# Patient Record
Sex: FEMALE | Race: WHITE | NOT HISPANIC OR LATINO | Employment: FULL TIME | URBAN - NONMETROPOLITAN AREA
[De-identification: names, ages, dates, MRNs, and addresses within clinical notes are randomized per-mention and may not be internally consistent; named-entity substitution may affect disease eponyms.]

---

## 2022-11-01 PROCEDURE — U0003 INFECTIOUS AGENT DETECTION BY NUCLEIC ACID (DNA OR RNA); SEVERE ACUTE RESPIRATORY SYNDROME CORONAVIRUS 2 (SARS-COV-2) (CORONAVIRUS DISEASE [COVID-19]), AMPLIFIED PROBE TECHNIQUE, MAKING USE OF HIGH THROUGHPUT TECHNOLOGIES AS DESCRIBED BY CMS-2020-01-R: HCPCS | Performed by: NURSE PRACTITIONER

## 2024-08-11 ENCOUNTER — APPOINTMENT (OUTPATIENT)
Dept: CT IMAGING | Facility: HOSPITAL | Age: 39
End: 2024-08-11
Payer: MEDICAID

## 2024-08-11 ENCOUNTER — HOSPITAL ENCOUNTER (EMERGENCY)
Facility: HOSPITAL | Age: 39
Discharge: HOME | End: 2024-08-11
Admitting: EMERGENCY MEDICINE
Payer: MEDICAID

## 2024-08-11 ENCOUNTER — APPOINTMENT (OUTPATIENT)
Dept: ULTRASOUND IMAGING | Facility: HOSPITAL | Age: 39
End: 2024-08-11
Payer: MEDICAID

## 2024-08-11 VITALS
BODY MASS INDEX: 27.75 KG/M2 | RESPIRATION RATE: 20 BRPM | WEIGHT: 147 LBS | TEMPERATURE: 98.7 F | OXYGEN SATURATION: 100 % | DIASTOLIC BLOOD PRESSURE: 73 MMHG | HEART RATE: 83 BPM | HEIGHT: 61 IN | SYSTOLIC BLOOD PRESSURE: 111 MMHG

## 2024-08-11 DIAGNOSIS — I26.99 PULMONARY EMBOLISM ON LEFT: Primary | ICD-10-CM

## 2024-08-11 LAB
ALBUMIN SERPL-MCNC: 3.9 G/DL (ref 3.5–5.2)
ALBUMIN/GLOB SERPL: 1.3 G/DL
ALP SERPL-CCNC: 104 U/L (ref 39–117)
ALT SERPL W P-5'-P-CCNC: 16 U/L (ref 1–33)
ANION GAP SERPL CALCULATED.3IONS-SCNC: 10 MMOL/L (ref 5–15)
AST SERPL-CCNC: 21 U/L (ref 1–32)
B-HCG UR QL: NEGATIVE
BASOPHILS # BLD AUTO: 0.06 10*3/MM3 (ref 0–0.2)
BASOPHILS NFR BLD AUTO: 0.6 % (ref 0–1.5)
BILIRUB SERPL-MCNC: <0.2 MG/DL (ref 0–1.2)
BUN SERPL-MCNC: 17 MG/DL (ref 6–20)
BUN/CREAT SERPL: 23.9 (ref 7–25)
CALCIUM SPEC-SCNC: 9.3 MG/DL (ref 8.6–10.5)
CHLORIDE SERPL-SCNC: 102 MMOL/L (ref 98–107)
CO2 SERPL-SCNC: 24 MMOL/L (ref 22–29)
CREAT SERPL-MCNC: 0.71 MG/DL (ref 0.57–1)
DEPRECATED RDW RBC AUTO: 54.9 FL (ref 37–54)
EGFRCR SERPLBLD CKD-EPI 2021: 111.1 ML/MIN/1.73
EOSINOPHIL # BLD AUTO: 0.22 10*3/MM3 (ref 0–0.4)
EOSINOPHIL NFR BLD AUTO: 2.3 % (ref 0.3–6.2)
ERYTHROCYTE [DISTWIDTH] IN BLOOD BY AUTOMATED COUNT: 17.2 % (ref 12.3–15.4)
EXPIRATION DATE: NORMAL
GLOBULIN UR ELPH-MCNC: 2.9 GM/DL
GLUCOSE SERPL-MCNC: 100 MG/DL (ref 65–99)
HCT VFR BLD AUTO: 32.1 % (ref 34–46.6)
HGB BLD-MCNC: 10.1 G/DL (ref 12–15.9)
IMM GRANULOCYTES # BLD AUTO: 0.08 10*3/MM3 (ref 0–0.05)
IMM GRANULOCYTES NFR BLD AUTO: 0.8 % (ref 0–0.5)
INTERNAL NEGATIVE CONTROL: NORMAL
INTERNAL POSITIVE CONTROL: NORMAL
LYMPHOCYTES # BLD AUTO: 2.76 10*3/MM3 (ref 0.7–3.1)
LYMPHOCYTES NFR BLD AUTO: 29.2 % (ref 19.6–45.3)
Lab: NORMAL
MCH RBC QN AUTO: 27.4 PG (ref 26.6–33)
MCHC RBC AUTO-ENTMCNC: 31.5 G/DL (ref 31.5–35.7)
MCV RBC AUTO: 87 FL (ref 79–97)
MONOCYTES # BLD AUTO: 1.17 10*3/MM3 (ref 0.1–0.9)
MONOCYTES NFR BLD AUTO: 12.4 % (ref 5–12)
NEUTROPHILS NFR BLD AUTO: 5.16 10*3/MM3 (ref 1.7–7)
NEUTROPHILS NFR BLD AUTO: 54.7 % (ref 42.7–76)
NRBC BLD AUTO-RTO: 0 /100 WBC (ref 0–0.2)
PLATELET # BLD AUTO: 299 10*3/MM3 (ref 140–450)
PMV BLD AUTO: 9.5 FL (ref 6–12)
POTASSIUM SERPL-SCNC: 4.3 MMOL/L (ref 3.5–5.2)
PROT SERPL-MCNC: 6.8 G/DL (ref 6–8.5)
RBC # BLD AUTO: 3.69 10*6/MM3 (ref 3.77–5.28)
SODIUM SERPL-SCNC: 136 MMOL/L (ref 136–145)
TROPONIN T SERPL HS-MCNC: <6 NG/L
WBC NRBC COR # BLD AUTO: 9.45 10*3/MM3 (ref 3.4–10.8)

## 2024-08-11 PROCEDURE — 25510000001 IOPAMIDOL PER 1 ML: Performed by: PHYSICIAN ASSISTANT

## 2024-08-11 PROCEDURE — 93010 ELECTROCARDIOGRAM REPORT: CPT | Performed by: EMERGENCY MEDICINE

## 2024-08-11 PROCEDURE — 80053 COMPREHEN METABOLIC PANEL: CPT | Performed by: EMERGENCY MEDICINE

## 2024-08-11 PROCEDURE — 81025 URINE PREGNANCY TEST: CPT | Performed by: PHYSICIAN ASSISTANT

## 2024-08-11 PROCEDURE — 93971 EXTREMITY STUDY: CPT

## 2024-08-11 PROCEDURE — 85025 COMPLETE CBC W/AUTO DIFF WBC: CPT | Performed by: EMERGENCY MEDICINE

## 2024-08-11 PROCEDURE — 84484 ASSAY OF TROPONIN QUANT: CPT | Performed by: PHYSICIAN ASSISTANT

## 2024-08-11 PROCEDURE — 93971 EXTREMITY STUDY: CPT | Performed by: SURGERY

## 2024-08-11 PROCEDURE — 99285 EMERGENCY DEPT VISIT HI MDM: CPT

## 2024-08-11 PROCEDURE — 93005 ELECTROCARDIOGRAM TRACING: CPT | Performed by: PHYSICIAN ASSISTANT

## 2024-08-11 PROCEDURE — 71275 CT ANGIOGRAPHY CHEST: CPT

## 2024-08-11 RX ORDER — HYDROCODONE BITARTRATE AND ACETAMINOPHEN 5; 325 MG/1; MG/1
1 TABLET ORAL ONCE
Status: COMPLETED | OUTPATIENT
Start: 2024-08-11 | End: 2024-08-11

## 2024-08-11 RX ORDER — PROPRANOLOL HYDROCHLORIDE 10 MG/1
10 TABLET ORAL 2 TIMES DAILY
COMMUNITY

## 2024-08-11 RX ORDER — RIVAROXABAN 15 MG-20MG
KIT ORAL
Qty: 51 EACH | Refills: 0 | Status: SHIPPED | OUTPATIENT
Start: 2024-08-11

## 2024-08-11 RX ADMIN — IOPAMIDOL 100 ML: 755 INJECTION, SOLUTION INTRAVENOUS at 20:50

## 2024-08-11 RX ADMIN — RIVAROXABAN 15 MG: 15 TABLET, FILM COATED ORAL at 21:43

## 2024-08-11 RX ADMIN — HYDROCODONE BITARTRATE AND ACETAMINOPHEN 1 TABLET: 5; 325 TABLET ORAL at 20:31

## 2024-08-12 LAB
QT INTERVAL: 406 MS
QTC INTERVAL: 459 MS

## 2024-08-12 NOTE — ED PROVIDER NOTES
Subjective   History of Present Illness  Patient is a pleasant 39-year-old female with chief complaint of left lower extremity pain and swelling.    Patient describes for the past 3 to 4 days, she noted some discomfort in the back of her knee down into her calf.  Initially, it began after she stood up from sitting for 3 hours.  She works as a .  She had continued pain worse with extension and also worse when she is lying down with her legs extended.  She denies any associated fever.  She has noted some swelling in her left leg more than right.  She denies any chest pain, pressure, or tightness.  She denies shortness of breath.  She denies any fever.  She denies any history of DVT or PE in the past.  She does vape and she is on birth control.  Patient did seek medical attention at urgent care first and was advised in the ER to be further evaluated.  She denies any surgeries or travels in the past 3 months.  She denies any trauma.    Review of Systems   Constitutional:  Positive for activity change. Negative for fever.   HENT: Negative.     Respiratory: Negative.  Negative for chest tightness and shortness of breath.    Cardiovascular: Negative.  Positive for leg swelling. Negative for chest pain and palpitations.   Gastrointestinal: Negative.    Genitourinary: Negative.    Musculoskeletal: Negative.    Neurological: Negative.    Psychiatric/Behavioral: Negative.     All other systems reviewed and are negative.      Past Medical History:   Diagnosis Date    Bipolar 1 disorder     Fibromyalgia     Migraine     Personality disorder        Allergies   Allergen Reactions    Calamine Phenolated     Beta Adrenergic Blockers Anxiety    Lamotrigine Anxiety    Nicotine Rash     Nicotine patches causes reddness       Past Surgical History:   Procedure Laterality Date    CHOLECYSTECTOMY         History reviewed. No pertinent family history.    Social History     Socioeconomic History    Marital status:  "   Tobacco Use    Smoking status: Every Day     Current packs/day: 1.00     Types: Cigarettes    Smokeless tobacco: Never   Substance and Sexual Activity    Alcohol use: Yes    Drug use: No    Sexual activity: Yes     Partners: Male       Prior to Admission medications    Medication Sig Start Date End Date Taking? Authorizing Provider   acetaminophen (TYLENOL) 500 MG tablet Take 1 tablet by mouth Every 6 (Six) Hours As Needed.    Moe Murillo MD   linaclotide (Linzess) 290 MCG capsule capsule Take 1 capsule by mouth Every Morning Before Breakfast.    Moe Murillo MD   lithium carbonate 300 MG capsule Take 300 mg by mouth 3 (three) times a day with meals.    Moe Murillo MD   Omega-3 Fatty Acids (fish oil) 1000 MG capsule capsule Take  by mouth Daily With Breakfast.    Moe Murillo MD   pantoprazole (PROTONIX) 40 MG EC tablet Take 1 tablet by mouth Daily. 5/24/16   Moe Murillo MD   risperiDONE (risperDAL) 2 MG tablet Take 1 tablet by mouth 2 (Two) Times a Day.    Moe Murillo MD   rizatriptan (MAXALT) 10 MG tablet Take 10 mg by mouth once as needed for Migraine May repeat in 2 hours if needed    Moe Murillo MD   venlafaxine (EFFEXOR) 37.5 MG tablet Take 37.5 mg by mouth 2 (two) times a day.    Moe Murillo MD       Medications   rivaroxaban (XARELTO) tablet 15 mg (has no administration in time range)   HYDROcodone-acetaminophen (NORCO) 5-325 MG per tablet 1 tablet (1 tablet Oral Given 8/11/24 2031)   iopamidol (ISOVUE-370) 76 % injection 100 mL (100 mL Intravenous Given 8/11/24 2050)       /73 (BP Location: Right arm, Patient Position: Sitting)   Pulse 83   Temp 98.7 °F (37.1 °C) (Oral)   Resp 20   Ht 154.9 cm (61\")   Wt 66.7 kg (147 lb)   LMP 08/04/2024 (Exact Date)   SpO2 100%   BMI 27.78 kg/m²       Objective   Physical Exam  Vitals and nursing note reviewed.   Constitutional:       General: She is not in acute " distress.     Appearance: She is well-developed. She is not diaphoretic.   HENT:      Head: Normocephalic and atraumatic.   Eyes:      Conjunctiva/sclera: Conjunctivae normal.      Pupils: Pupils are equal, round, and reactive to light.   Neck:      Trachea: No tracheal deviation.   Cardiovascular:      Rate and Rhythm: Regular rhythm. Tachycardia present.      Heart sounds: Normal heart sounds. No murmur heard.  Pulmonary:      Effort: Pulmonary effort is normal.      Breath sounds: Normal breath sounds.   Abdominal:      General: Bowel sounds are normal. There is no distension.      Palpations: Abdomen is soft. There is no mass.      Tenderness: There is no abdominal tenderness. There is no guarding or rebound.   Musculoskeletal:         General: Swelling and tenderness present. Normal range of motion.      Cervical back: Normal range of motion and neck supple.      Right lower leg: No edema.      Left lower leg: Edema present.      Comments: Patient's left calf is notably larger than her right calf by a couple centimeters.  She does have sunburn on the right leg.  Tender to touch on the left gastrocnemius.   Skin:     General: Skin is warm and dry.      Capillary Refill: Capillary refill takes less than 2 seconds.   Neurological:      Mental Status: She is alert and oriented to person, place, and time.   Psychiatric:         Behavior: Behavior normal.         Thought Content: Thought content normal.         Judgment: Judgment normal.         Procedures         Lab Results (last 24 hours)       Procedure Component Value Units Date/Time    CBC & Differential [[id number]]  (Abnormal) Collected: 08/11/24 2027    Specimen: Blood Updated: 08/11/24 2035    Narrative:      The following orders were created for panel order CBC & Differential.  Procedure                               Abnormality         Status                     ---------                               -----------         ------                     CBC Auto  Differential[[id number]]         Abnormal            Final result                 Please view results for these tests on the individual orders.    Comprehensive Metabolic Panel [[id number]]  (Abnormal) Collected: 08/11/24 2027    Specimen: Blood Updated: 08/11/24 2053     Glucose 100 mg/dL      BUN 17 mg/dL      Creatinine 0.71 mg/dL      Sodium 136 mmol/L      Potassium 4.3 mmol/L      Chloride 102 mmol/L      CO2 24.0 mmol/L      Calcium 9.3 mg/dL      Total Protein 6.8 g/dL      Albumin 3.9 g/dL      ALT (SGPT) 16 U/L      AST (SGOT) 21 U/L      Alkaline Phosphatase 104 U/L      Total Bilirubin <0.2 mg/dL      Globulin 2.9 gm/dL      A/G Ratio 1.3 g/dL      BUN/Creatinine Ratio 23.9     Anion Gap 10.0 mmol/L      eGFR 111.1 mL/min/1.73     Narrative:      GFR Normal >60  Chronic Kidney Disease <60  Kidney Failure <15      CBC Auto Differential [[id number]]  (Abnormal) Collected: 08/11/24 2027    Specimen: Blood Updated: 08/11/24 2035     WBC 9.45 10*3/mm3      RBC 3.69 10*6/mm3      Hemoglobin 10.1 g/dL      Hematocrit 32.1 %      MCV 87.0 fL      MCH 27.4 pg      MCHC 31.5 g/dL      RDW 17.2 %      RDW-SD 54.9 fl      MPV 9.5 fL      Platelets 299 10*3/mm3      Neutrophil % 54.7 %      Lymphocyte % 29.2 %      Monocyte % 12.4 %      Eosinophil % 2.3 %      Basophil % 0.6 %      Immature Grans % 0.8 %      Neutrophils, Absolute 5.16 10*3/mm3      Lymphocytes, Absolute 2.76 10*3/mm3      Monocytes, Absolute 1.17 10*3/mm3      Eosinophils, Absolute 0.22 10*3/mm3      Basophils, Absolute 0.06 10*3/mm3      Immature Grans, Absolute 0.08 10*3/mm3      nRBC 0.0 /100 WBC     Single High Sensitivity Troponin T [[id number]]  (Normal) Collected: 08/11/24 2027    Specimen: Blood Updated: 08/11/24 2051     HS Troponin T <6 ng/L     Narrative:      High Sensitive Troponin T Reference Range:  <14.0 ng/L- Negative Female for AMI  <22.0 ng/L- Negative Male for AMI  >=14 - Abnormal Female indicating possible myocardial injury.  >=22 -  Abnormal Male indicating possible myocardial injury.   Clinicians would have to utilize clinical acumen, EKG, Troponin, and serial changes to determine if it is an Acute Myocardial Infarction or myocardial injury due to an underlying chronic condition.         POC Urine Pregnancy [[id number]]  (Normal) Collected: 08/11/24 2033    Specimen: Urine Updated: 08/11/24 2033     HCG, Urine, QL Negative     Lot Number \[id number]\     Internal Positive Control Passed     Internal Negative Control Passed     Expiration Date \01/28/2025\            Encounter Date    8/11/24    US Venous Doppler Lower Extremity Left (duplex) [[id number]] (Order [id number])  Order  Status: In process     Patient Location    Patient Class Location   Emergency [facility] EMERGENCY DEPT, DI13, 13     [phone]     Study Notes     Jeniffer Mosley on 8/11/2024  8:59 PM CDT   LT Lower Extremity  No thrombus visualized at this time     CT Angiogram Chest    Result Date: 8/11/2024  Narrative: CT ANGIOGRAM CHEST- 8/11/2024 7:46 PM  HISTORY: tachy leg swelling  COMPARISON: None  DOSE LENGTH PRODUCT: 206.37 mGy.cm. Automated exposure control was also utilized to decrease patient radiation dose.  TECHNIQUE: Helical tomographic images of the chest were obtained after the administration of intravenous contrast following angiogram protocol. Additionally, 3D and multiplanar reformatted images were provided.   FINDINGS:  Pulmonary arteries: There is adequate enhancement of the pulmonary arteries to evaluate for central and segmental pulmonary emboli. Acute pulmonary embolus in the left upper lobe lingula branch, image 69. The pulmonary arteries are within normal limits for size.  AORTA AND GREAT VESSELS: Thoracic aorta is normal in caliber. No dissection identified. No flow-limiting stenosis identified at the great vessel origins.  VISUALIZED NECK BASE: The imaged portion of the base of the neck appears unremarkable.  LUNGS: Lungs are well expanded. No consolidation. No  pleural effusion. Airways are clear.  HEART: The heart is normal in size. There is no pericardial effusion.  MEDIASTINUM AND LYMPH NODEs: No suspicious hilar or mediastinal adenopathy..  SKELETAL AND SOFT TISSUES: Chest wall soft tissues are unremarkable. No acute bony abnormality.  UPPER ABDOMEN: The imaged portion of the upper abdomen demonstrates no acute process.      Impression: 1.  Acute pulmonary embolus in the left upper lobe lingula branch. No evidence of right heart strain.   This report was signed and finalized on 8/11/2024 9:05 PM by Dr Jaswinder Payan.       ED Course  ED Course as of 08/11/24 2132   Sun Aug 11, 2024   2119 0 points  Nery Score  Stage I  Low risk  4.4 %  PE-related complications (death from PE, hemodynamic collapse, or recurrent nonfatal PE) at 30 days    3.1% PE-related mortality at 30 days [TK]   2121 EPSI [TK]   2122 Pulmonary embolism severity index:  39 points  Class I, Very Low Risk: 0-1.6% 30-day mortality in this group. [TK]   2130 Patient has been reassessed.  She has been educated the ultrasound did not show a DVT.  However, her CTA chest did show an acute pulmonary embolus in the left upper lobe lingula branch with no evidence of right heart strain.  Patient is no longer tachycardic.  She is not hypoxic.  Please see the Pesi score as well as Nery score.  She is low risk in both algorithms.    She will receive a dose of Xarelto while here in the ED.  Risk and benefits of DOAC treatment for her diagnosis of pulmonary embolism as well as the diagnose of pulmonary embolism has been discussed at length with the patient.  She understands that she will need to get off oral contraceptives as well as stop vaping.  Recommend close follow-up with her primary care provider.  Strict return precaution advised.  Patient voiced understanding will be discharged in stable condition. [TK]      ED Course User Index  [TK] Hattie Darling PA          [facility]      Final diagnoses:   Pulmonary  embolism on left     Disposition: Patient be discharged in stable condition.       Hattie Darling PA  08/11/24 2877

## 2024-08-29 ENCOUNTER — TELEPHONE (OUTPATIENT)
Dept: VASCULAR SURGERY | Facility: CLINIC | Age: 39
End: 2024-08-29
Payer: MEDICAID

## 2024-08-30 ENCOUNTER — PATIENT ROUNDING (BHMG ONLY) (OUTPATIENT)
Dept: VASCULAR SURGERY | Facility: CLINIC | Age: 39
End: 2024-08-30

## 2024-08-30 ENCOUNTER — OFFICE VISIT (OUTPATIENT)
Dept: VASCULAR SURGERY | Facility: CLINIC | Age: 39
End: 2024-08-30
Payer: MEDICAID

## 2024-08-30 VITALS
OXYGEN SATURATION: 98 % | BODY MASS INDEX: 27.38 KG/M2 | SYSTOLIC BLOOD PRESSURE: 118 MMHG | WEIGHT: 145 LBS | HEART RATE: 62 BPM | HEIGHT: 61 IN | DIASTOLIC BLOOD PRESSURE: 80 MMHG

## 2024-08-30 DIAGNOSIS — I26.93 SINGLE SUBSEGMENTAL PULMONARY EMBOLISM WITHOUT ACUTE COR PULMONALE: Primary | ICD-10-CM

## 2024-08-30 PROCEDURE — 1159F MED LIST DOCD IN RCRD: CPT | Performed by: NURSE PRACTITIONER

## 2024-08-30 PROCEDURE — 99214 OFFICE O/P EST MOD 30 MIN: CPT | Performed by: NURSE PRACTITIONER

## 2024-08-30 PROCEDURE — 1160F RVW MEDS BY RX/DR IN RCRD: CPT | Performed by: NURSE PRACTITIONER

## 2024-08-30 RX ORDER — PALIPERIDONE 3 MG
3 TABLET, EXTENDED RELEASE 24 HR ORAL EVERY MORNING
COMMUNITY

## 2024-08-30 RX ORDER — BUPROPION HYDROCHLORIDE 150 MG/1
150 TABLET ORAL EVERY MORNING
COMMUNITY
Start: 2024-08-23

## 2024-08-30 RX ORDER — ARIPIPRAZOLE 2 MG/1
2 TABLET ORAL DAILY
COMMUNITY

## 2024-08-30 RX ORDER — LEVOTHYROXINE SODIUM 125 UG/1
125 TABLET ORAL DAILY
COMMUNITY
Start: 2024-08-06

## 2024-08-30 RX ORDER — DICYCLOMINE HYDROCHLORIDE 10 MG/1
10 CAPSULE ORAL AS NEEDED
COMMUNITY
Start: 2024-08-06

## 2024-08-30 RX ORDER — HYDROXYZINE HYDROCHLORIDE 25 MG/1
1 TABLET, FILM COATED ORAL 3 TIMES DAILY
COMMUNITY
Start: 2024-07-31

## 2024-08-30 RX ORDER — FERROUS SULFATE 325(65) MG
325 TABLET ORAL 3 TIMES WEEKLY
COMMUNITY
Start: 2024-08-07

## 2024-08-30 NOTE — PROGRESS NOTES
08/30/2024      Viola Phoenix, TAINA  [address]  [facility]  [place] [address]    Rafia Miller  1985    Chief Complaint   Patient presents with    Pulmonary Embolism     Referred from Viola Phoenix for acute pulmonary embolism. Testing done 8/11/24. Patient states she's had been sitting at work, but after standing her left leg was hurting. Lynette to ED due to symptoms, and found that it had moved to lungs. Former smoker of 20 years, quit 2 months ago and started vaping.        Dear TAINA Brizuela:      HPI  I had the pleasure of seeing your patient Rafia Miller in the office today.  Thank you kindly for this consultation.  As you recall, Rafia Miller is a 39 y.o.  female who you are currently following for routine health maintenance.  Couple weeks ago she was seen in the emergency department with left lower extremity pain and swelling for 3 to 4-day.  She was sitting at work and when she stood up her left leg was hurting noted some swelling in the left more than the right.  She denies any previous history of DVT or in the past.  She did have noninvasive testing performed, which I did review in office.    Past Medical History:   Diagnosis Date    Bipolar 1 disorder     Fibromyalgia     Migraine     Personality disorder     Pulmonary embolism        Past Surgical History:   Procedure Laterality Date    CHOLECYSTECTOMY      COLONOSCOPY      ENDOSCOPY         Family History   Problem Relation Age of Onset    Cancer Mother        Social History     Socioeconomic History    Marital status:    Tobacco Use    Smoking status: Every Day     Current packs/day: 1.00     Types: Cigarettes    Smokeless tobacco: Never   Substance and Sexual Activity    Alcohol use: Yes    Drug use: No    Sexual activity: Yes     Partners: Male       Allergies   Allergen Reactions    Calamine Phenolated     Beta Adrenergic Blockers Anxiety    Lamotrigine Anxiety    Nicotine Rash     Nicotine patches causes  "reddness       Current Outpatient Medications   Medication Instructions    Abilify 2 mg, Oral, Daily    acetaminophen (TYLENOL) 500 mg, Oral, Every 6 Hours PRN    buPROPion XL (WELLBUTRIN XL) 150 mg, Oral, Every Morning    cyclobenzaprine (FLEXERIL) 5 mg, Oral, 3 Times Daily PRN    dicyclomine (BENTYL) 10 mg, Oral, As Needed    ferrous sulfate 325 mg, Oral, 3 Times Weekly    hydrOXYzine (ATARAX) 25 MG tablet 1 tablet, Oral, 3 times daily    Invega 3 mg, Oral, Every Morning    levothyroxine (SYNTHROID, LEVOTHROID) 125 mcg, Oral, Daily    linaclotide (LINZESS) 290 mcg, Oral, Every Morning Before Breakfast    lithium carbonate 300 mg, Oral, 3 Times Daily With Meals    Omega-3 Fatty Acids (fish oil) 1000 MG capsule capsule Oral, Daily With Breakfast    pantoprazole (PROTONIX) 40 mg, Oral, Daily    propranolol (INDERAL) 10 mg, Oral, 2 Times Daily    Rivaroxaban (Xarelto Starter Pack) tablet therapy pack starter pack Take as directed    rizatriptan (MAXALT) 10 MG tablet Take 10 mg by mouth once as needed for Migraine May repeat in 2 hours if needed    venlafaxine (EFFEXOR) 37.5 mg, Oral, 2 Times Daily        Review of Systems   Constitutional: Negative.    HENT: Negative.     Eyes: Negative.    Respiratory: Negative.     Cardiovascular:  Positive for leg swelling.   Gastrointestinal: Negative.    Endocrine: Negative.    Genitourinary: Negative.    Musculoskeletal: Negative.    Skin: Negative.    Allergic/Immunologic: Negative.    Neurological: Negative.    Hematological: Negative.    Psychiatric/Behavioral: Negative.         /80   Pulse 62   Ht 154.9 cm (61\")   Wt 65.8 kg (145 lb)   LMP 08/04/2024 (Exact Date)   SpO2 98%   BMI 27.40 kg/m²   Physical Exam  Vitals and nursing note reviewed.   Constitutional:       General: She is not in acute distress.     Appearance: Normal appearance. She is well-developed and overweight. She is not diaphoretic.   HENT:      Head: Normocephalic and atraumatic.   Eyes:      " General: No scleral icterus.     Pupils: Pupils are equal, round, and reactive to light.   Neck:      Thyroid: No thyromegaly.      Vascular: No carotid bruit or JVD.   Cardiovascular:      Rate and Rhythm: Normal rate and regular rhythm.      Pulses: Normal pulses.      Heart sounds: Normal heart sounds and S2 normal. No murmur heard.     No friction rub. No gallop.   Pulmonary:      Effort: Pulmonary effort is normal.      Breath sounds: Normal breath sounds.   Abdominal:      General: Bowel sounds are normal.      Palpations: Abdomen is soft.   Musculoskeletal:         General: Normal range of motion.      Cervical back: Normal range of motion and neck supple.   Skin:     General: Skin is warm and dry.   Neurological:      Mental Status: She is alert and oriented to person, place, and time.      Cranial Nerves: No cranial nerve deficit.   Psychiatric:         Behavior: Behavior normal.         Thought Content: Thought content normal.         Judgment: Judgment normal.         US Venous Doppler Lower Extremity Left (duplex)    Result Date: 9/3/2024  Narrative: History: Pain and swelling      Impression: Impression: There is no evidence of deep venous thrombosis or superficial thrombophlebitis of the left lower extremity.  Comments: Left lower extremity venous duplex exam was performed using color Doppler flow, Doppler wave form analysis, and grayscale imaging, with and without compression. There is no evidence of deep venous thrombosis of the common femoral, superficial femoral, popliteal, posterior tibial, and peroneal veins. There is no thrombus identified in the saphenofemoral junction or the greater saphenous vein.   This report was signed and finalized on 9/3/2024 1:17 PM by Dr. Kong Baker MD.      US Venous Doppler Lower Extremity Left (duplex)    Result Date: 8/12/2024  Narrative: History: Pain and swelling      Impression: Impression: There is no evidence of deep venous thrombosis or superficial  thrombophlebitis of the left lower extremity.  Comments: Left lower extremity venous duplex exam was performed using color Doppler flow, Doppler wave form analysis, and grayscale imaging, with and without compression. There is no evidence of deep venous thrombosis of the common femoral, superficial femoral, popliteal, posterior tibial, and peroneal veins. There is no thrombus identified in the saphenofemoral junction or the greater saphenous vein.   This report was signed and finalized on 8/12/2024 9:50 AM by Dr. Kong Baker MD.      CT Angiogram Chest    Result Date: 8/11/2024  Narrative: CT ANGIOGRAM CHEST- 8/11/2024 7:46 PM  HISTORY: tachy leg swelling  COMPARISON: None  DOSE LENGTH PRODUCT: 206.37 mGy.cm. Automated exposure control was also utilized to decrease patient radiation dose.  TECHNIQUE: Helical tomographic images of the chest were obtained after the administration of intravenous contrast following angiogram protocol. Additionally, 3D and multiplanar reformatted images were provided.   FINDINGS:  Pulmonary arteries: There is adequate enhancement of the pulmonary arteries to evaluate for central and segmental pulmonary emboli. Acute pulmonary embolus in the left upper lobe lingula branch, image 69. The pulmonary arteries are within normal limits for size.  AORTA AND GREAT VESSELS: Thoracic aorta is normal in caliber. No dissection identified. No flow-limiting stenosis identified at the great vessel origins.  VISUALIZED NECK BASE: The imaged portion of the base of the neck appears unremarkable.  LUNGS: Lungs are well expanded. No consolidation. No pleural effusion. Airways are clear.  HEART: The heart is normal in size. There is no pericardial effusion.  MEDIASTINUM AND LYMPH NODEs: No suspicious hilar or mediastinal adenopathy..  SKELETAL AND SOFT TISSUES: Chest wall soft tissues are unremarkable. No acute bony abnormality.  UPPER ABDOMEN: The imaged portion of the upper abdomen demonstrates no  acute process.      Impression: 1.  Acute pulmonary embolus in the left upper lobe lingula branch. No evidence of right heart strain.   This report was signed and finalized on 8/11/2024 9:05 PM by Dr Jaswinder Payan.       There is no problem list on file for this patient.        ICD-10-CM ICD-9-CM   1. Single subsegmental pulmonary embolism without acute cor pulmonale  I26.93 415.19           Plan: After thoroughly evaluating Rafia Miller, I believe the best course of action is to remain conservative from vascular surgery standpoint.  I did review her testing which showed no evidence of DVT however she had did have an acute PE.  She denied any chest pain or shortness of breath.  I would recommend her to discontinue vaping.  She will need to be on anticoagulation for a year given pulmonary embolus.  This will be managed by her primary care provider.  There is no intervention needed from a vascular surgery standpoint.  I did recommend she could wear compression stockings if she develops swelling to her lower extremities.  She can follow-up in our office as needed.  The patient can continue taking their current medication regimen as previously planned.  This was all discussed in full with complete understanding.    Thank you for allowing me to participate in the care of your patient.  Please do not hesitate with any questions or concerns.  I will keep you aware of any further encounters with Rafia Miller.        Sincerely yours,         TAINA Mayberry

## 2024-08-30 NOTE — LETTER
September 4, 2024       No Recipients    Patient: Rafia Miller   YOB: 1985   Date of Visit: 8/30/2024     Dear TAINA Billings:       Thank you for referring Rafia Miller to me for evaluation. Below are the relevant portions of my assessment and plan of care.    If you have questions, please do not hesitate to call me. I look forward to following Rafia along with you.         Sincerely,        TAINA Mayberry        CC:   No Recipients    Chetna Claudio APRN  09/04/24 1722  Sign when Signing Visit  08/30/2024      Viola Phoenix APRN  [address] [place] [address]    Rafia Miller  1985    Chief Complaint   Patient presents with   • Pulmonary Embolism     Referred from Viola Phoenix for acute pulmonary embolism. Testing done 8/11/24. Patient states she's had been sitting at work, but after standing her left leg was hurting. Lynette to ED due to symptoms, and found that it had moved to lungs. Former smoker of 20 years, quit 2 months ago and started vaping.        Dear Viola Phoenix, APRN:      HPI  I had the pleasure of seeing your patient Rafia Miller in the office today.  Thank you kindly for this consultation.  As you recall, Rafia Miller is a 39 y.o.  female who you are currently following for routine health maintenance.  Couple weeks ago she was seen in the emergency department with left lower extremity pain and swelling for 3 to 4-day.  She was sitting at work and when she stood up her left leg was hurting noted some swelling in the left more than the right.  She denies any previous history of DVT or in the past.  She did have noninvasive testing performed, which I did review in office.    Past Medical History:   Diagnosis Date   • Bipolar 1 disorder    • Fibromyalgia    • Migraine    • Personality disorder    • Pulmonary embolism        Past Surgical History:   Procedure Laterality Date   • CHOLECYSTECTOMY     • COLONOSCOPY     •  ENDOSCOPY         Family History   Problem Relation Age of Onset   • Cancer Mother        Social History     Socioeconomic History   • Marital status:    Tobacco Use   • Smoking status: Every Day     Current packs/day: 1.00     Types: Cigarettes   • Smokeless tobacco: Never   Substance and Sexual Activity   • Alcohol use: Yes   • Drug use: No   • Sexual activity: Yes     Partners: Male       Allergies   Allergen Reactions   • Calamine Phenolated    • Beta Adrenergic Blockers Anxiety   • Lamotrigine Anxiety   • Nicotine Rash     Nicotine patches causes reddness       Current Outpatient Medications   Medication Instructions   • Abilify 2 mg, Oral, Daily   • acetaminophen (TYLENOL) 500 mg, Oral, Every 6 Hours PRN   • buPROPion XL (WELLBUTRIN XL) 150 mg, Oral, Every Morning   • cyclobenzaprine (FLEXERIL) 5 mg, Oral, 3 Times Daily PRN   • dicyclomine (BENTYL) 10 mg, Oral, As Needed   • ferrous sulfate 325 mg, Oral, 3 Times Weekly   • hydrOXYzine (ATARAX) 25 MG tablet 1 tablet, Oral, 3 times daily   • Invega 3 mg, Oral, Every Morning   • levothyroxine (SYNTHROID, LEVOTHROID) 125 mcg, Oral, Daily   • linaclotide (LINZESS) 290 mcg, Oral, Every Morning Before Breakfast   • lithium carbonate 300 mg, Oral, 3 Times Daily With Meals   • Omega-3 Fatty Acids (fish oil) 1000 MG capsule capsule Oral, Daily With Breakfast   • pantoprazole (PROTONIX) 40 mg, Oral, Daily   • propranolol (INDERAL) 10 mg, Oral, 2 Times Daily   • Rivaroxaban (Xarelto Starter Pack) tablet therapy pack starter pack Take as directed   • rizatriptan (MAXALT) 10 MG tablet Take 10 mg by mouth once as needed for Migraine May repeat in 2 hours if needed   • venlafaxine (EFFEXOR) 37.5 mg, Oral, 2 Times Daily        Review of Systems   Constitutional: Negative.    HENT: Negative.     Eyes: Negative.    Respiratory: Negative.     Cardiovascular:  Positive for leg swelling.   Gastrointestinal: Negative.    Endocrine: Negative.    Genitourinary: Negative.   "  Musculoskeletal: Negative.    Skin: Negative.    Allergic/Immunologic: Negative.    Neurological: Negative.    Hematological: Negative.    Psychiatric/Behavioral: Negative.         /80   Pulse 62   Ht 154.9 cm (61\")   Wt 65.8 kg (145 lb)   LMP 08/04/2024 (Exact Date)   SpO2 98%   BMI 27.40 kg/m²   Physical Exam  Vitals and nursing note reviewed.   Constitutional:       General: She is not in acute distress.     Appearance: Normal appearance. She is well-developed and overweight. She is not diaphoretic.   HENT:      Head: Normocephalic and atraumatic.   Eyes:      General: No scleral icterus.     Pupils: Pupils are equal, round, and reactive to light.   Neck:      Thyroid: No thyromegaly.      Vascular: No carotid bruit or JVD.   Cardiovascular:      Rate and Rhythm: Normal rate and regular rhythm.      Pulses: Normal pulses.      Heart sounds: Normal heart sounds and S2 normal. No murmur heard.     No friction rub. No gallop.   Pulmonary:      Effort: Pulmonary effort is normal.      Breath sounds: Normal breath sounds.   Abdominal:      General: Bowel sounds are normal.      Palpations: Abdomen is soft.   Musculoskeletal:         General: Normal range of motion.      Cervical back: Normal range of motion and neck supple.   Skin:     General: Skin is warm and dry.   Neurological:      Mental Status: She is alert and oriented to person, place, and time.      Cranial Nerves: No cranial nerve deficit.   Psychiatric:         Behavior: Behavior normal.         Thought Content: Thought content normal.         Judgment: Judgment normal.         US Venous Doppler Lower Extremity Left (duplex)    Result Date: 9/3/2024  Narrative: History: Pain and swelling      Impression: Impression: There is no evidence of deep venous thrombosis or superficial thrombophlebitis of the left lower extremity.  Comments: Left lower extremity venous duplex exam was performed using color Doppler flow, Doppler wave form analysis, and " grayscale imaging, with and without compression. There is no evidence of deep venous thrombosis of the common femoral, superficial femoral, popliteal, posterior tibial, and peroneal veins. There is no thrombus identified in the saphenofemoral junction or the greater saphenous vein.   This report was signed and finalized on 9/3/2024 1:17 PM by Dr. Kong Baker MD.      US Venous Doppler Lower Extremity Left (duplex)    Result Date: 8/12/2024  Narrative: History: Pain and swelling      Impression: Impression: There is no evidence of deep venous thrombosis or superficial thrombophlebitis of the left lower extremity.  Comments: Left lower extremity venous duplex exam was performed using color Doppler flow, Doppler wave form analysis, and grayscale imaging, with and without compression. There is no evidence of deep venous thrombosis of the common femoral, superficial femoral, popliteal, posterior tibial, and peroneal veins. There is no thrombus identified in the saphenofemoral junction or the greater saphenous vein.   This report was signed and finalized on 8/12/2024 9:50 AM by Dr. Kong Baker MD.      CT Angiogram Chest    Result Date: 8/11/2024  Narrative: CT ANGIOGRAM CHEST- 8/11/2024 7:46 PM  HISTORY: tachy leg swelling  COMPARISON: None  DOSE LENGTH PRODUCT: 206.37 mGy.cm. Automated exposure control was also utilized to decrease patient radiation dose.  TECHNIQUE: Helical tomographic images of the chest were obtained after the administration of intravenous contrast following angiogram protocol. Additionally, 3D and multiplanar reformatted images were provided.   FINDINGS:  Pulmonary arteries: There is adequate enhancement of the pulmonary arteries to evaluate for central and segmental pulmonary emboli. Acute pulmonary embolus in the left upper lobe lingula branch, image 69. The pulmonary arteries are within normal limits for size.  AORTA AND GREAT VESSELS: Thoracic aorta is normal in caliber. No dissection  identified. No flow-limiting stenosis identified at the great vessel origins.  VISUALIZED NECK BASE: The imaged portion of the base of the neck appears unremarkable.  LUNGS: Lungs are well expanded. No consolidation. No pleural effusion. Airways are clear.  HEART: The heart is normal in size. There is no pericardial effusion.  MEDIASTINUM AND LYMPH NODEs: No suspicious hilar or mediastinal adenopathy..  SKELETAL AND SOFT TISSUES: Chest wall soft tissues are unremarkable. No acute bony abnormality.  UPPER ABDOMEN: The imaged portion of the upper abdomen demonstrates no acute process.      Impression: 1.  Acute pulmonary embolus in the left upper lobe lingula branch. No evidence of right heart strain.   This report was signed and finalized on 8/11/2024 9:05 PM by Dr Jaswinder Payan.       There is no problem list on file for this patient.        ICD-10-CM ICD-9-CM   1. Single subsegmental pulmonary embolism without acute cor pulmonale  I26.93 415.19           Plan: After thoroughly evaluating Rafia Miller, I believe the best course of action is to remain conservative from vascular surgery standpoint.  I did review her testing which showed no evidence of DVT however she had did have an acute PE.  She denied any chest pain or shortness of breath.  I would recommend her to discontinue vaping.  She will need to be on anticoagulation for a year given pulmonary embolus.  This will be managed by her primary care provider.  There is no intervention needed from a vascular surgery standpoint.  I did recommend she could wear compression stockings if she develops swelling to her lower extremities.  She can follow-up in our office as needed.  The patient can continue taking their current medication regimen as previously planned.  This was all discussed in full with complete understanding.    Thank you for allowing me to participate in the care of your patient.  Please do not hesitate with any questions or concerns.  I will  keep you aware of any further encounters with Rafia Miller.        Sincerely yours,         TAINA Mayberry

## 2024-08-30 NOTE — PROGRESS NOTES
August 30, 2024    Hello, may I speak with Rafia Miller?    My name is Rosetta Morelos CMA    I am  with [facility]  [address], [address]  [place] [place] [address] [phone].    Before we get started may I verify your date of birth? 1985    I am calling to officially welcome you to our practice and ask about your recent visit. Is this a good time to talk? yes    Tell me about your visit with us. What things went well?  appointment well        We're always looking for ways to make our patients' experiences even better. Do you have recommendations on ways we may improve?  no    Overall were you satisfied with your first visit to our practice? yes       I appreciate you taking the time to speak with me today. Is there anything else I can do for you? no      Thank you, and have a great day.

## 2024-09-02 ENCOUNTER — HOSPITAL ENCOUNTER (EMERGENCY)
Facility: HOSPITAL | Age: 39
Discharge: HOME | End: 2024-09-02
Attending: INTERNAL MEDICINE
Payer: MEDICAID

## 2024-09-02 ENCOUNTER — APPOINTMENT (OUTPATIENT)
Dept: ULTRASOUND IMAGING | Facility: HOSPITAL | Age: 39
End: 2024-09-02
Payer: MEDICAID

## 2024-09-02 VITALS
OXYGEN SATURATION: 99 % | SYSTOLIC BLOOD PRESSURE: 113 MMHG | TEMPERATURE: 98.2 F | HEIGHT: 61 IN | DIASTOLIC BLOOD PRESSURE: 75 MMHG | WEIGHT: 147 LBS | RESPIRATION RATE: 16 BRPM | BODY MASS INDEX: 27.75 KG/M2 | HEART RATE: 79 BPM

## 2024-09-02 DIAGNOSIS — M79.605 LEFT LEG PAIN: Primary | ICD-10-CM

## 2024-09-02 PROCEDURE — 93971 EXTREMITY STUDY: CPT | Performed by: SURGERY

## 2024-09-02 PROCEDURE — 99284 EMERGENCY DEPT VISIT MOD MDM: CPT

## 2024-09-02 PROCEDURE — 93971 EXTREMITY STUDY: CPT

## 2024-09-02 RX ORDER — CYCLOBENZAPRINE HCL 5 MG
5 TABLET ORAL 3 TIMES DAILY PRN
Qty: 15 TABLET | Refills: 0 | Status: SHIPPED | OUTPATIENT
Start: 2024-09-02

## 2024-09-02 NOTE — DISCHARGE INSTRUCTIONS
Muscle relaxer was called into your pharmacy this afternoon as we discussed.  I am hopeful that this will help with your pain.  Return to the emergency department if your symptoms worsen.    Please call your PCP tomorrow for possible follow-up.  Please continue with your Xarelto as previously directed.

## 2024-09-02 NOTE — ED PROVIDER NOTES
Subjective   History of Present Illness  39-year-old female who presents emergency department with left leg pain.  She states it started about an hour and a half ago prior to presentation.  She points to the left posterior thigh area.  She states that she was recently diagnosed with a PE.  She is concerned that she has a clot in her leg as well.  She is currently taking the Xarelto starter pack.  She did miss 1 dose about a week ago.  She has no chest pain.  She has no shortness of breath.    Review of Systems   Respiratory:  Negative for shortness of breath.    Cardiovascular:  Negative for chest pain.   Musculoskeletal:  Positive for myalgias.       Past Medical History:   Diagnosis Date    Bipolar 1 disorder     Fibromyalgia     Migraine     Personality disorder     Pulmonary embolism        Allergies   Allergen Reactions    Calamine Phenolated     Beta Adrenergic Blockers Anxiety    Lamotrigine Anxiety    Nicotine Rash     Nicotine patches causes reddness       Past Surgical History:   Procedure Laterality Date    CHOLECYSTECTOMY      COLONOSCOPY      ENDOSCOPY         Family History   Problem Relation Age of Onset    Cancer Mother        Social History     Socioeconomic History    Marital status:    Tobacco Use    Smoking status: Every Day     Current packs/day: 1.00     Types: Cigarettes    Smokeless tobacco: Never   Substance and Sexual Activity    Alcohol use: Yes    Drug use: No    Sexual activity: Yes     Partners: Male           Objective   Physical Exam  Vitals reviewed.   Constitutional:       Appearance: Normal appearance. She is not ill-appearing.   HENT:      Head: Normocephalic and atraumatic.      Right Ear: External ear normal.      Left Ear: External ear normal.      Nose: Nose normal.   Eyes:      General: No scleral icterus.     Conjunctiva/sclera: Conjunctivae normal.   Cardiovascular:      Rate and Rhythm: Normal rate and regular rhythm.   Pulmonary:      Effort: Pulmonary effort is  normal.   Abdominal:      Palpations: Abdomen is soft.   Musculoskeletal:         General: No tenderness.      Cervical back: Normal range of motion and neck supple.   Skin:     General: Skin is warm and dry.      Comments: Palpation of the posterior thigh did not reveal any lumps or nodules under the skin.  There is no noted abrasion or injury to the skin.  No erythema of the skin.  No edema in the area of the pain.   Neurological:      Mental Status: She is alert and oriented to person, place, and time.      Cranial Nerves: No cranial nerve deficit.   Psychiatric:         Mood and Affect: Mood normal.         Behavior: Behavior normal.         Procedures       Venous doppler ordered. Patient offered pain medication and declined at this time.     ED Course  ED Course as of 09/02/24 1239   Mon Sep 02, 2024   1237 I spoke with the patient.  Discussed that she did not have a DVT in the left leg.  Discussed if she would like for me to call in a muscle relaxer to see if that would help with the pain.  She voiced understanding was agreeable with this plan discharge.  She will be discharged home in stable condition. [AJ]      ED Course User Index  [AJ] Placido Durand DO Flickinger, Brian J on 9/2/2024 12:02 PM CDT   LLE no thrombus vis                              Muscle relaxer was called into your pharmacy this afternoon as we discussed. I am hopeful that this will help with your pain.  Return to the emergency department if your symptoms worsen.   Please call your PCP tomorrow for possible follow-up.  Please continue with your Xarelto as previously directed.             Medical Decision Making  DDX: Cellulitis, DVT, Muscle strain    Amount and/or Complexity of Data Reviewed  Radiology: ordered.     Details: Venous US Left extremity         Final diagnoses:   Left leg pain       ED Disposition  ED Disposition       ED Disposition   Discharge    Condition   Stable    Comment   --               Suzanne Cook,  APRN  [address] [place]  [place] [id number] [phone]    In 2 days           Medication List        New Prescriptions      cyclobenzaprine 5 MG tablet  Commonly known as: FLEXERIL  Take 1 tablet by mouth 3 (Three) Times a Day As Needed for Muscle Spasms.               Where to Get Your Medications        These medications were sent to [facility]/pharmacy #[id number] - [place], [place] - [address] AT ACROSS FROM [address]  [phone] [facility] [phone] [facility] [address] [facility] [address]      Phone: [phone]   cyclobenzaprine 5 MG tablet            Placido Durand, DO  09/02/24 1048       Placido Durand, DO  09/02/24 1212       Placido Durand, DO  09/02/24 1234

## 2025-02-06 ENCOUNTER — HOSPITAL ENCOUNTER (EMERGENCY)
Facility: HOSPITAL | Age: 40
Discharge: HOME | End: 2025-02-06
Attending: EMERGENCY MEDICINE
Payer: MEDICAID

## 2025-02-06 ENCOUNTER — APPOINTMENT (OUTPATIENT)
Dept: GENERAL RADIOLOGY | Facility: HOSPITAL | Age: 40
End: 2025-02-06
Payer: MEDICAID

## 2025-02-06 VITALS
DIASTOLIC BLOOD PRESSURE: 89 MMHG | RESPIRATION RATE: 20 BRPM | HEART RATE: 87 BPM | TEMPERATURE: 97.8 F | SYSTOLIC BLOOD PRESSURE: 115 MMHG | OXYGEN SATURATION: 100 % | BODY MASS INDEX: 27.75 KG/M2 | WEIGHT: 147 LBS | HEIGHT: 61 IN

## 2025-02-06 DIAGNOSIS — R79.89 ELEVATED LFTS: ICD-10-CM

## 2025-02-06 DIAGNOSIS — R11.2 NAUSEA AND VOMITING, UNSPECIFIED VOMITING TYPE: Primary | ICD-10-CM

## 2025-02-06 LAB
ALBUMIN SERPL-MCNC: 4.6 G/DL (ref 3.5–5.2)
ALBUMIN/GLOB SERPL: 1.9 G/DL
ALP SERPL-CCNC: 85 U/L (ref 39–117)
ALT SERPL W P-5'-P-CCNC: 46 U/L (ref 1–33)
ANION GAP SERPL CALCULATED.3IONS-SCNC: 14 MMOL/L (ref 5–15)
AST SERPL-CCNC: 53 U/L (ref 1–32)
BASOPHILS # BLD AUTO: 0.04 10*3/MM3 (ref 0–0.2)
BASOPHILS NFR BLD AUTO: 0.9 % (ref 0–1.5)
BILIRUB SERPL-MCNC: 0.3 MG/DL (ref 0–1.2)
BUN SERPL-MCNC: 10 MG/DL (ref 6–20)
BUN/CREAT SERPL: 11.8 (ref 7–25)
CALCIUM SPEC-SCNC: 10.4 MG/DL (ref 8.6–10.5)
CHLORIDE SERPL-SCNC: 104 MMOL/L (ref 98–107)
CO2 SERPL-SCNC: 22 MMOL/L (ref 22–29)
CREAT SERPL-MCNC: 0.85 MG/DL (ref 0.57–1)
DEPRECATED RDW RBC AUTO: 47.8 FL (ref 37–54)
EGFRCR SERPLBLD CKD-EPI 2021: 89.5 ML/MIN/1.73
EOSINOPHIL # BLD AUTO: 0.11 10*3/MM3 (ref 0–0.4)
EOSINOPHIL NFR BLD AUTO: 2.4 % (ref 0.3–6.2)
ERYTHROCYTE [DISTWIDTH] IN BLOOD BY AUTOMATED COUNT: 14.1 % (ref 12.3–15.4)
FLUAV RNA RESP QL NAA+PROBE: NOT DETECTED
FLUBV RNA RESP QL NAA+PROBE: NOT DETECTED
GLOBULIN UR ELPH-MCNC: 2.4 GM/DL
GLUCOSE SERPL-MCNC: 81 MG/DL (ref 65–99)
HCG SERPL QL: NEGATIVE
HCT VFR BLD AUTO: 35 % (ref 34–46.6)
HGB BLD-MCNC: 11.3 G/DL (ref 12–15.9)
IMM GRANULOCYTES # BLD AUTO: 0.03 10*3/MM3 (ref 0–0.05)
IMM GRANULOCYTES NFR BLD AUTO: 0.7 % (ref 0–0.5)
LIPASE SERPL-CCNC: 42 U/L (ref 13–60)
LITHIUM SERPL-SCNC: 0.8 MMOL/L (ref 0.6–1.2)
LYMPHOCYTES # BLD AUTO: 1.45 10*3/MM3 (ref 0.7–3.1)
LYMPHOCYTES NFR BLD AUTO: 31.9 % (ref 19.6–45.3)
MAGNESIUM SERPL-MCNC: 1.8 MG/DL (ref 1.6–2.6)
MCH RBC QN AUTO: 29.9 PG (ref 26.6–33)
MCHC RBC AUTO-ENTMCNC: 32.3 G/DL (ref 31.5–35.7)
MCV RBC AUTO: 92.6 FL (ref 79–97)
MONOCYTES # BLD AUTO: 0.7 10*3/MM3 (ref 0.1–0.9)
MONOCYTES NFR BLD AUTO: 15.4 % (ref 5–12)
NEUTROPHILS NFR BLD AUTO: 2.22 10*3/MM3 (ref 1.7–7)
NEUTROPHILS NFR BLD AUTO: 48.7 % (ref 42.7–76)
NRBC BLD AUTO-RTO: 0 /100 WBC (ref 0–0.2)
PLATELET # BLD AUTO: 253 10*3/MM3 (ref 140–450)
PMV BLD AUTO: 9.4 FL (ref 6–12)
POTASSIUM SERPL-SCNC: 4 MMOL/L (ref 3.5–5.2)
PROCALCITONIN SERPL-MCNC: 0.04 NG/ML (ref 0–0.25)
PROT SERPL-MCNC: 7 G/DL (ref 6–8.5)
RBC # BLD AUTO: 3.78 10*6/MM3 (ref 3.77–5.28)
SARS-COV-2 RNA RESP QL NAA+PROBE: NOT DETECTED
SODIUM SERPL-SCNC: 140 MMOL/L (ref 136–145)
WBC NRBC COR # BLD AUTO: 4.55 10*3/MM3 (ref 3.4–10.8)

## 2025-02-06 PROCEDURE — 80053 COMPREHEN METABOLIC PANEL: CPT | Performed by: NURSE PRACTITIONER

## 2025-02-06 PROCEDURE — 87636 SARSCOV2 & INF A&B AMP PRB: CPT | Performed by: NURSE PRACTITIONER

## 2025-02-06 PROCEDURE — 71045 X-RAY EXAM CHEST 1 VIEW: CPT

## 2025-02-06 PROCEDURE — 83735 ASSAY OF MAGNESIUM: CPT | Performed by: NURSE PRACTITIONER

## 2025-02-06 PROCEDURE — 83690 ASSAY OF LIPASE: CPT | Performed by: NURSE PRACTITIONER

## 2025-02-06 PROCEDURE — 84703 CHORIONIC GONADOTROPIN ASSAY: CPT | Performed by: NURSE PRACTITIONER

## 2025-02-06 PROCEDURE — 99283 EMERGENCY DEPT VISIT LOW MDM: CPT

## 2025-02-06 PROCEDURE — 96374 THER/PROPH/DIAG INJ IV PUSH: CPT

## 2025-02-06 PROCEDURE — 63710000001 PROCHLORPERAZINE MALEATE PER 10 MG: Performed by: EMERGENCY MEDICINE

## 2025-02-06 PROCEDURE — 84145 PROCALCITONIN (PCT): CPT | Performed by: NURSE PRACTITIONER

## 2025-02-06 PROCEDURE — 25010000002 ONDANSETRON PER 1 MG: Performed by: NURSE PRACTITIONER

## 2025-02-06 PROCEDURE — 80178 ASSAY OF LITHIUM: CPT | Performed by: NURSE PRACTITIONER

## 2025-02-06 PROCEDURE — 25810000003 SODIUM CHLORIDE 0.9 % SOLUTION: Performed by: NURSE PRACTITIONER

## 2025-02-06 PROCEDURE — 85025 COMPLETE CBC W/AUTO DIFF WBC: CPT | Performed by: NURSE PRACTITIONER

## 2025-02-06 RX ORDER — PROCHLORPERAZINE MALEATE 10 MG
10 TABLET ORAL ONCE
Status: COMPLETED | OUTPATIENT
Start: 2025-02-06 | End: 2025-02-06

## 2025-02-06 RX ORDER — ONDANSETRON 4 MG/1
4 TABLET, ORALLY DISINTEGRATING ORAL EVERY 6 HOURS PRN
Qty: 12 TABLET | Refills: 0 | Status: SHIPPED | OUTPATIENT
Start: 2025-02-06

## 2025-02-06 RX ORDER — PROCHLORPERAZINE MALEATE 10 MG
10 TABLET ORAL EVERY 6 HOURS PRN
Qty: 10 TABLET | Refills: 0 | Status: SHIPPED | OUTPATIENT
Start: 2025-02-06

## 2025-02-06 RX ORDER — ONDANSETRON 2 MG/ML
4 INJECTION INTRAMUSCULAR; INTRAVENOUS ONCE
Status: COMPLETED | OUTPATIENT
Start: 2025-02-06 | End: 2025-02-06

## 2025-02-06 RX ORDER — SODIUM CHLORIDE 0.9 % (FLUSH) 0.9 %
10 SYRINGE (ML) INJECTION AS NEEDED
Status: DISCONTINUED | OUTPATIENT
Start: 2025-02-06 | End: 2025-02-06 | Stop reason: HOSPADM

## 2025-02-06 RX ADMIN — SODIUM CHLORIDE 1000 ML: 9 INJECTION, SOLUTION INTRAVENOUS at 01:41

## 2025-02-06 RX ADMIN — ONDANSETRON 4 MG: 2 INJECTION INTRAMUSCULAR; INTRAVENOUS at 01:41

## 2025-02-06 RX ADMIN — PROCHLORPERAZINE MALEATE 10 MG: 10 TABLET ORAL at 03:24

## 2025-02-06 NOTE — Clinical Note
[facility] EMERGENCY DEPARTMENT  [address] [place] [address] AVE  [facility] [address]  Phone: [phone]    Rafia Miller was seen and treated in our emergency department on 2/6/2025.  She may return to work on 02/07/2025.         Thank you for choosing [facility].    Lucinda Castillo APRN

## 2025-02-06 NOTE — Clinical Note
[facility] EMERGENCY DEPARTMENT  [address] [place] [address] AVE  [facility] [address]  Phone: [phone]    Rafia Miller was seen and treated in our emergency department on 2/6/2025.  She may return to work on 02/10/2025.         Thank you for choosing [facility].    Aravind Oneill Jr., MD

## 2025-02-06 NOTE — ED PROVIDER NOTES
"Subjective   History of Present Illness  39-year-old female patient presents the ED with complaints of vomiting.  She states that she has had a few episodes over the past 1.5 weeks, but has vomited consistently today.  She reports \"6-7 rounds\" of vomiting.  Denies hematemesis. She endorses nausea.  She has had some recent medication changes.  With direction of her provider, she has slowly decreased dosages of Invega, Abilify, and Effexor and began taking Caplyta with onset of symptoms a week and a half ago.  She believes that her symptoms may be related to this.    She reports diarrhea, but states this is not unusual due to her history of IBS.  She has had no increase in her frequency or consistency of diarrhea.  She denies any black or bloody stools.  She reports \"a little bit\" of generalized abdominal pain, 4/10, without any abdominal tenderness on exam.  She denies fever and chills. She denies any dysuria, hematuria, frequency, or urgency.  No CVA tenderness.  She denies any vaginal bleeding.  LNMP 3 to 4 weeks ago.  G1, P0.  She denies any vaginal discharge.  Abdominal surgical history includes cholecystectomy.      She states that several coworkers have \"the flu\".  She reports a cough that is productive with clear sputum.  Denies hemoptysis. Denies chest pain and SOA.     She is also requesting \"something to drink\" and a \"work note\" because \"my boss doesn't think a medication change is a reason to miss work\". .         Review of Systems   Constitutional:  Negative for chills and fever.   Respiratory:  Positive for cough. Negative for shortness of breath.    Cardiovascular:  Negative for chest pain.   Gastrointestinal:  Positive for abdominal pain, diarrhea, nausea and vomiting. Negative for anal bleeding and blood in stool.   Genitourinary:  Negative for difficulty urinating, dysuria, frequency, hematuria, urgency, vaginal bleeding and vaginal discharge.       Past Medical History:   Diagnosis Date    Bipolar 1 " disorder     Fibromyalgia     Migraine     Personality disorder     Pulmonary embolism        Allergies   Allergen Reactions    Calamine Phenolated     Beta Adrenergic Blockers Anxiety    Lamotrigine Anxiety    Nicotine Rash     Nicotine patches causes reddness       Past Surgical History:   Procedure Laterality Date    CHOLECYSTECTOMY      COLONOSCOPY      ENDOSCOPY         Family History   Problem Relation Age of Onset    Cancer Mother        Social History     Socioeconomic History    Marital status:    Tobacco Use    Smoking status: Every Day     Current packs/day: 1.00     Types: Cigarettes    Smokeless tobacco: Never   Substance and Sexual Activity    Alcohol use: Yes    Drug use: No    Sexual activity: Yes     Partners: Male           Objective   Physical Exam  Vitals and nursing note reviewed.   Constitutional:       General: She is not in acute distress.     Appearance: Normal appearance. She is not ill-appearing, toxic-appearing or diaphoretic.   HENT:      Head: Normocephalic and atraumatic.      Right Ear: External ear normal.      Left Ear: External ear normal.      Nose: Nose normal.      Mouth/Throat:      Mouth: Mucous membranes are moist. Mucous membranes are dry.      Pharynx: No oropharyngeal exudate or posterior oropharyngeal erythema.   Eyes:      Extraocular Movements: Extraocular movements intact.      Conjunctiva/sclera: Conjunctivae normal.      Pupils: Pupils are equal, round, and reactive to light.   Cardiovascular:      Rate and Rhythm: Normal rate and regular rhythm.      Pulses: Normal pulses.      Heart sounds: Normal heart sounds. No murmur heard.  Pulmonary:      Effort: Pulmonary effort is normal. No respiratory distress.      Breath sounds: Normal breath sounds. No stridor.   Abdominal:      General: Abdomen is flat. Bowel sounds are normal. There is no distension.      Palpations: Abdomen is soft.      Tenderness: There is no abdominal tenderness. There is no right CVA  tenderness, left CVA tenderness or guarding.   Musculoskeletal:      Cervical back: Normal range of motion and neck supple. No rigidity or tenderness.      Comments: Moves all extremities equally and independently.    Lymphadenopathy:      Cervical: No cervical adenopathy.   Skin:     General: Skin is warm and dry.      Capillary Refill: Capillary refill takes less than 2 seconds.   Neurological:      General: No focal deficit present.      Mental Status: She is alert and oriented to person, place, and time.   Psychiatric:         Mood and Affect: Mood normal.         Behavior: Behavior normal. Behavior is cooperative.         Procedures       Lab Results (last 24 hours)       Procedure Component Value Units Date/Time    CBC & Differential [[id number]]  (Abnormal) Collected: 02/06/25 0141    Specimen: Blood Updated: 02/06/25 0150    Narrative:      The following orders were created for panel order CBC & Differential.  Procedure                               Abnormality         Status                     ---------                               -----------         ------                     CBC Auto Differential[[id number]]        Abnormal            Final result                 Please view results for these tests on the individual orders.    Comprehensive Metabolic Panel [[id number]]  (Abnormal) Collected: 02/06/25 0141    Specimen: Blood Updated: 02/06/25 0208     Glucose 81 mg/dL      BUN 10 mg/dL      Creatinine 0.85 mg/dL      Sodium 140 mmol/L      Potassium 4.0 mmol/L      Chloride 104 mmol/L      CO2 22.0 mmol/L      Calcium 10.4 mg/dL      Total Protein 7.0 g/dL      Albumin 4.6 g/dL      ALT (SGPT) 46 U/L      AST (SGOT) 53 U/L      Alkaline Phosphatase 85 U/L      Total Bilirubin 0.3 mg/dL      Globulin 2.4 gm/dL      A/G Ratio 1.9 g/dL      BUN/Creatinine Ratio 11.8     Anion Gap 14.0 mmol/L      eGFR 89.5 mL/min/1.73     Narrative:      GFR Categories in Chronic Kidney Disease (CKD)      GFR Category          " GFR (mL/min/1.73)    Interpretation  G1                     90 or greater         Normal or high (1)  G2                      60-89                Mild decrease (1)  G3a                   45-59                Mild to moderate decrease  G3b                   30-44                Moderate to severe decrease  G4                    15-29                Severe decrease  G5                    14 or less           Kidney failure          (1)In the absence of evidence of kidney disease, neither GFR category G1 or G2 fulfill the criteria for CKD.    eGFR calculation 2021 CKD-EPI creatinine equation, which does not include race as a factor    Lipase [[id number]]  (Normal) Collected: 02/06/25 0141    Specimen: Blood Updated: 02/06/25 0203     Lipase 42 U/L     Procalcitonin [[id number]]  (Normal) Collected: 02/06/25 0141    Specimen: Blood Updated: 02/06/25 0214     Procalcitonin 0.04 ng/mL     Narrative:      As a Marker for Sepsis (Non-Neonates):    1. <0.5 ng/mL represents a low risk of severe sepsis and/or septic shock.  2. >2 ng/mL represents a high risk of severe sepsis and/or septic shock.    As a Marker for Lower Respiratory Tract Infections that require antibiotic therapy:    PCT on Admission    Antibiotic Therapy       6-12 Hrs later    >0.5                Strongly Recommended  >0.25 - <0.5        Recommended   0.1 - 0.25          Discouraged              Remeasure/reassess PCT  <0.1                Strongly Discouraged     Remeasure/reassess PCT    As 28 day mortality risk marker: \"Change in Procalcitonin Result\" (>80% or <=80%) if Day 0 (or Day 1) and Day 4 values are available. Refer to http://www.[employer]-pct-calculator.com    Change in PCT <=80%  A decrease of PCT levels below or equal to 80% defines a positive change in PCT test result representing a higher risk for 28-day all-cause mortality of patients diagnosed with severe sepsis for septic shock.    Change in PCT >80%  A decrease of PCT levels of more than 80% " defines a negative change in PCT result representing a lower risk for 28-day all-cause mortality of patients diagnosed with severe sepsis or septic shock.       Magnesium [[id number]]  (Normal) Collected: 02/06/25 0141    Specimen: Blood Updated: 02/06/25 0208     Magnesium 1.8 mg/dL     Lithium Level [[id number]]  (Normal) Collected: 02/06/25 0141    Specimen: Blood Updated: 02/06/25 0235     Lithium 0.8 mmol/L     CBC Auto Differential [[id number]]  (Abnormal) Collected: 02/06/25 0141    Specimen: Blood Updated: 02/06/25 0150     WBC 4.55 10*3/mm3      RBC 3.78 10*6/mm3      Hemoglobin 11.3 g/dL      Hematocrit 35.0 %      MCV 92.6 fL      MCH 29.9 pg      MCHC 32.3 g/dL      RDW 14.1 %      RDW-SD 47.8 fl      MPV 9.4 fL      Platelets 253 10*3/mm3      Neutrophil % 48.7 %      Lymphocyte % 31.9 %      Monocyte % 15.4 %      Eosinophil % 2.4 %      Basophil % 0.9 %      Immature Grans % 0.7 %      Neutrophils, Absolute 2.22 10*3/mm3      Lymphocytes, Absolute 1.45 10*3/mm3      Monocytes, Absolute 0.70 10*3/mm3      Eosinophils, Absolute 0.11 10*3/mm3      Basophils, Absolute 0.04 10*3/mm3      Immature Grans, Absolute 0.03 10*3/mm3      nRBC 0.0 /100 WBC     COVID PRE-OP / PRE-PROCEDURE SCREENING ORDER (NO ISOLATION) - Swab, Nasopharynx [[id number]]  (Normal) Collected: 02/06/25 0141    Specimen: Swab from Nasopharynx Updated: 02/06/25 0214    Narrative:      The following orders were created for panel order COVID PRE-OP / PRE-PROCEDURE SCREENING ORDER (NO ISOLATION) - Swab, Nasopharynx.  Procedure                               Abnormality         Status                     ---------                               -----------         ------                     COVID-19 and FLU A/B PCR...[[id number]]  Normal              Final result                 Please view results for these tests on the individual orders.    COVID-19 and FLU A/B PCR, 1 HR TAT - Swab, Nasopharynx [[id number]]  (Normal) Collected: 02/06/25 0141     Specimen: Swab from Nasopharynx Updated: 02/06/25 0214     COVID19 Not Detected     Influenza A PCR Not Detected     Influenza B PCR Not Detected    Narrative:      Fact sheet for providers: https://www.fda.gov/media/193868/download    Fact sheet for patients: https://www.fda.gov/media/212390/download    Test performed by PCR.    hCG, Serum, Qualitative [[id number]]  (Normal) Collected: 02/06/25 0141    Specimen: Blood Updated: 02/06/25 0200     HCG Qualitative Negative                ED Course  ED Course as of 02/06/25 0308   Thu Feb 06, 2025   0307 I took over from Lucinda at shift change.  The patient is been stable here in the emergency room without any vomiting.  I told all of her lab work returned fine except for some mildly elevated liver enzymes.  Her description sounds more like a viral illness to me than anything else.  I did offer to perform a CT scan of her abdomen because of the mildly elevated liver enzymes but she decided not to have that done this morning.  She did not want to wait the time it would take to get that done.  I did advise her to follow-up with her family doctor to get him rechecked to make sure if they stay elevated or if is just a temporary thing because of this illness.  I did give her something different for the nausea and vomiting to use at home and told her not to try to stop the diarrhea.  She is discharged in stable condition. [TR]      ED Course User Index  [TR] Aravind Oneill Jr., MD                                                       Medical Decision Making  I told the patient her labs are normal except for some mildly elevated LFTs and she did not want to get a CT scan done this morning.  I did give her something different for the nausea and vomiting because it sounds like a viral illness and she can follow-up with her family doctor to have her lab work rechecked in the future.  She is discharged in stable condition.    Problems Addressed:  Elevated LFTs: complicated acute  illness or injury  Nausea and vomiting, unspecified vomiting type: complicated acute illness or injury    Amount and/or Complexity of Data Reviewed  Labs: ordered.  Radiology: ordered.    Risk  Prescription drug management.        Final diagnoses:   Nausea and vomiting, unspecified vomiting type   Elevated LFTs       ED Disposition  ED Disposition       ED Disposition   Discharge    Condition   Stable    Comment   --               Perfecto Cookdarnell HERNANDEZ, APRN  [address] [place]  [place] [address] [phone]      As needed, If not improving and sooner if worsening         Medication List        New Prescriptions      ondansetron ODT 4 MG disintegrating tablet  Commonly known as: ZOFRAN-ODT  Take 1 tablet by mouth Every 6 (Six) Hours As Needed for Nausea or Vomiting.     prochlorperazine 10 MG tablet  Commonly known as: COMPAZINE  Take 1 tablet by mouth Every 6 (Six) Hours As Needed for Nausea or Vomiting.               Where to Get Your Medications        These medications were sent to [facility]/pharmacy #[id number] - [place], [place] - [address] AT ACROSS FROM [address]  [phone]  - [phone]   [address] [facility] [address]      Phone: [phone]   ondansetron ODT 4 MG disintegrating tablet  prochlorperazine 10 MG tablet            Aravind Oneill Jr., MD  02/06/25 2494

## 2025-03-04 ENCOUNTER — APPOINTMENT (OUTPATIENT)
Dept: GENERAL RADIOLOGY | Facility: HOSPITAL | Age: 40
End: 2025-03-04
Payer: MEDICAID

## 2025-03-04 ENCOUNTER — HOSPITAL ENCOUNTER (EMERGENCY)
Facility: HOSPITAL | Age: 40
Discharge: HOME | End: 2025-03-04
Payer: MEDICAID

## 2025-03-04 ENCOUNTER — APPOINTMENT (OUTPATIENT)
Dept: ULTRASOUND IMAGING | Facility: HOSPITAL | Age: 40
End: 2025-03-04
Payer: MEDICAID

## 2025-03-04 VITALS
DIASTOLIC BLOOD PRESSURE: 82 MMHG | WEIGHT: 145 LBS | TEMPERATURE: 97.9 F | RESPIRATION RATE: 18 BRPM | HEIGHT: 61 IN | SYSTOLIC BLOOD PRESSURE: 112 MMHG | OXYGEN SATURATION: 98 % | HEART RATE: 69 BPM | BODY MASS INDEX: 27.38 KG/M2

## 2025-03-04 DIAGNOSIS — M25.561 POSTERIOR RIGHT KNEE PAIN: Primary | ICD-10-CM

## 2025-03-04 PROCEDURE — 93971 EXTREMITY STUDY: CPT

## 2025-03-04 PROCEDURE — 73562 X-RAY EXAM OF KNEE 3: CPT

## 2025-03-04 PROCEDURE — 93971 EXTREMITY STUDY: CPT | Performed by: SURGERY

## 2025-03-04 PROCEDURE — 99284 EMERGENCY DEPT VISIT MOD MDM: CPT

## 2025-03-05 NOTE — ED PROVIDER NOTES
"Subjective   History of Present Illness  39-year-old female patient presents the ED with complaints of pain and swelling in her right lower extremity.  She reports a history of a DVT and subsequent PE in this extremity and states the symptoms are similar.  She does take Xarelto daily, but reports being \"sick last week with food poisoning\" and she is concerned about probably absorbing her medications due to the vomiting.  She is also concerned due to her immobility for the week.  She denies a history of cancer.  No surgery within the past 12 weeks.  No fever or chills.  Patient able to ambulate and bear weight on the affected extremity. Denies chest pain and SOA.       Review of Systems   Respiratory:  Negative for shortness of breath.    Cardiovascular:  Positive for leg swelling. Negative for chest pain.   Musculoskeletal:  Positive for myalgias.       Past Medical History:   Diagnosis Date    Bipolar 1 disorder     Fibromyalgia     Migraine     Personality disorder     Pulmonary embolism        Allergies   Allergen Reactions    Calamine Phenolated     Beta Adrenergic Blockers Anxiety    Lamotrigine Anxiety    Nicotine Rash     Nicotine patches causes reddness       Past Surgical History:   Procedure Laterality Date    CHOLECYSTECTOMY      COLONOSCOPY      ENDOSCOPY         Family History   Problem Relation Age of Onset    Cancer Mother        Social History     Socioeconomic History    Marital status:    Tobacco Use    Smoking status: Every Day     Current packs/day: 1.00     Types: Cigarettes    Smokeless tobacco: Never   Substance and Sexual Activity    Alcohol use: Yes    Drug use: No    Sexual activity: Yes     Partners: Male           Objective   Physical Exam  Vitals and nursing note reviewed.   Constitutional:       General: She is awake. She is not in acute distress.     Appearance: She is not ill-appearing, toxic-appearing or diaphoretic.   HENT:      Head: Normocephalic and atraumatic.      Right " Ear: External ear normal.      Left Ear: External ear normal.      Nose: Nose normal.      Mouth/Throat:      Mouth: Mucous membranes are moist.   Eyes:      Extraocular Movements: Extraocular movements intact.      Conjunctiva/sclera: Conjunctivae normal.   Cardiovascular:      Rate and Rhythm: Normal rate and regular rhythm.      Pulses: Normal pulses.           Dorsalis pedis pulses are 2+ on the right side.        Posterior tibial pulses are 2+ on the right side.      Heart sounds: Normal heart sounds. No murmur heard.     Comments: Right calf 30 cm. Left calf 31.5 cm.   Pulmonary:      Effort: Pulmonary effort is normal. No respiratory distress.      Breath sounds: Normal breath sounds. No stridor.   Musculoskeletal:      Cervical back: Normal range of motion. No rigidity.      Comments: Moves all extremities independently and equally.    Skin:     General: Skin is warm and dry.      Capillary Refill: Capillary refill takes less than 2 seconds.   Neurological:      General: No focal deficit present.      Mental Status: She is alert and oriented to person, place, and time.   Psychiatric:         Mood and Affect: Mood normal.         Behavior: Behavior normal. Behavior is cooperative.         Procedures             ED Course  ED Course as of 03/05/25 0604   Tue Mar 04, 2025   1931 Wells' Criteria for DVT - MDCalc  1 points -> Moderate risk group for DVT. See Next Steps for details.    US Venous Duplex ordered. ED  notified.  [TD]   2222 XR Knee 3 View Right  IMPRESSION:  No acute findings.   [TD]   2222 US Venous Doppler Lower Extremity Right (duplex)  Mily Patel on 3/4/2025   Call back Rt LEV prelim. (-) No evidence of DVT. Patient on Xarelto. Final report pending.     [TD]      ED Course User Index  [TD] Lucinda Castillo APRN                                                       Medical Decision Making  Course of treatment in the ED:  Labs Reviewed - No data to display      Medications - No data to  display      XR Knee 3 View Right   Final Result    No acute findings.              This report was signed and finalized on 3/4/2025 8:01 PM by Dr. Kofi Lockhart MD.          US Venous Doppler Lower Extremity Right (duplex)    (Results Pending)      Problems Addressed:  Posterior right knee pain: complicated acute illness or injury    Amount and/or Complexity of Data Reviewed  Radiology: ordered. Decision-making details documented in ED Course.        Final diagnoses:   Posterior right knee pain       ED Disposition  ED Disposition       ED Disposition   Discharge    Condition   Stable    Comment   --               Suzanne Cook, APRN  [address] [facility] [address] [phone]      As needed, If not improving and sooner if worsening    [facility] EMERGENCY DEPARTMENT  [address] [place] [id number] [phone]    As needed, If not improving and sooner if worsening         Medication List      No changes were made to your prescriptions during this visit.          pain       ED Disposition  ED Disposition       ED Disposition   Discharge    Condition   Stable    Comment   --               Suzanne Cook, APRN  [address] [facility] [address] [phone]      As needed, If not improving and sooner if worsening    [facility] EMERGENCY DEPARTMENT  [address] [place] [id number] [phone]    As needed, If not improving and sooner if worsening         Medication List      No changes were made to your prescriptions during this visit.            Lucinda Castillo, APRN  03/12/25 0639

## 2025-03-05 NOTE — DISCHARGE INSTRUCTIONS
It was very nice to meet you. Thank you for allowing us to take care of you today at [facility].     Today you were seen in the emergency department for your symptoms. Please understand that an ER evaluation is just the start of your evaluation. We do the best we can, but we are often unable to fully find what is causing your symptoms from this single evaluation.  Because of this, the goal is to determine whether you need to be admitted to the hospital or if it is safe for you to go home and follow-up with your other health care providers such as your primary care provider physicians or a specialist on an outpatient basis.      Like we discussed, I strongly urge that you follow up with your primary care provider. Please call their office to set up an appointment as soon as possible so that you can be re-evaluated for your symptoms or for any other questions.  I have provided the information needed, including phone number, to call to set up an appointment in these discharge papers.      Educational material has also been provided in the following pages. Please take the time to read through this information for important and helpful information.      Please return to the emergency room within 12-48 hours if you experience symptoms such as the following:   Fever, chills, chest pain or shortness of breath, pain with inspiration/expiration, pain that travels to your arms, neck or back, nausea, vomiting, severe headache, tearing pain in your chest, dizziness, feel as though you are about to pass out, OR if you have any worsening symptoms, or any other concerns.

## 2025-06-05 ENCOUNTER — APPOINTMENT (OUTPATIENT)
Dept: GENERAL RADIOLOGY | Facility: HOSPITAL | Age: 40
End: 2025-06-05
Payer: MEDICAID

## 2025-06-05 PROCEDURE — 71046 X-RAY EXAM CHEST 2 VIEWS: CPT

## 2025-07-08 ENCOUNTER — HOSPITAL ENCOUNTER (EMERGENCY)
Facility: HOSPITAL | Age: 40
Discharge: HOME | End: 2025-07-08
Admitting: EMERGENCY MEDICINE
Payer: MEDICAID

## 2025-07-08 ENCOUNTER — APPOINTMENT (OUTPATIENT)
Dept: CT IMAGING | Facility: HOSPITAL | Age: 40
End: 2025-07-08
Payer: MEDICAID

## 2025-07-08 VITALS
WEIGHT: 144 LBS | DIASTOLIC BLOOD PRESSURE: 78 MMHG | TEMPERATURE: 98 F | RESPIRATION RATE: 20 BRPM | BODY MASS INDEX: 27.19 KG/M2 | SYSTOLIC BLOOD PRESSURE: 110 MMHG | OXYGEN SATURATION: 99 % | HEIGHT: 61 IN | HEART RATE: 70 BPM

## 2025-07-08 DIAGNOSIS — R78.89 ABNORMAL BLOOD LEVEL OF LITHIUM: ICD-10-CM

## 2025-07-08 DIAGNOSIS — H53.8 BLURRED VISION: Primary | ICD-10-CM

## 2025-07-08 DIAGNOSIS — R79.89 ELEVATED TSH: ICD-10-CM

## 2025-07-08 LAB
ALBUMIN SERPL-MCNC: 4.5 G/DL (ref 3.5–5.2)
ALBUMIN/GLOB SERPL: 1.6 G/DL
ALP SERPL-CCNC: 74 U/L (ref 39–117)
ALT SERPL W P-5'-P-CCNC: 9 U/L (ref 1–33)
ANION GAP SERPL CALCULATED.3IONS-SCNC: 11 MMOL/L (ref 5–15)
AST SERPL-CCNC: 13 U/L (ref 1–32)
B-HCG UR QL: NEGATIVE
BASOPHILS # BLD MANUAL: 0 10*3/MM3 (ref 0–0.2)
BASOPHILS NFR BLD MANUAL: 0 % (ref 0–1.5)
BILIRUB SERPL-MCNC: 0.2 MG/DL (ref 0–1.2)
BILIRUB UR QL STRIP: NEGATIVE
BUN SERPL-MCNC: 8.8 MG/DL (ref 6–20)
BUN/CREAT SERPL: 12.1 (ref 7–25)
CALCIUM SPEC-SCNC: 9.8 MG/DL (ref 8.6–10.5)
CHLORIDE SERPL-SCNC: 104 MMOL/L (ref 98–107)
CLARITY UR: CLEAR
CO2 SERPL-SCNC: 21 MMOL/L (ref 22–29)
COLOR UR: YELLOW
CREAT SERPL-MCNC: 0.73 MG/DL (ref 0.57–1)
DEPRECATED RDW RBC AUTO: 45.9 FL (ref 37–54)
EGFRCR SERPLBLD CKD-EPI 2021: 106.8 ML/MIN/1.73
ELLIPTOCYTES BLD QL SMEAR: ABNORMAL
EOSINOPHIL # BLD MANUAL: 0 10*3/MM3 (ref 0–0.4)
EOSINOPHIL NFR BLD MANUAL: 0 % (ref 0.3–6.2)
ERYTHROCYTE [DISTWIDTH] IN BLOOD BY AUTOMATED COUNT: 13.8 % (ref 12.3–15.4)
EXPIRATION DATE: NORMAL
GLOBULIN UR ELPH-MCNC: 2.8 GM/DL
GLUCOSE BLDC GLUCOMTR-MCNC: 100 MG/DL (ref 70–130)
GLUCOSE SERPL-MCNC: 94 MG/DL (ref 65–99)
GLUCOSE UR STRIP-MCNC: NEGATIVE MG/DL
HCT VFR BLD AUTO: 34.3 % (ref 34–46.6)
HGB BLD-MCNC: 11.3 G/DL (ref 12–15.9)
HGB UR QL STRIP.AUTO: NEGATIVE
INTERNAL NEGATIVE CONTROL: NEGATIVE
INTERNAL POSITIVE CONTROL: POSITIVE
KETONES UR QL STRIP: NEGATIVE
LEUKOCYTE ESTERASE UR QL STRIP.AUTO: NEGATIVE
LITHIUM SERPL-SCNC: 0.5 MMOL/L (ref 0.6–1.2)
LYMPHOCYTES # BLD MANUAL: 1.57 10*3/MM3 (ref 0.7–3.1)
LYMPHOCYTES NFR BLD MANUAL: 9.2 % (ref 5–12)
Lab: NORMAL
MAGNESIUM SERPL-MCNC: 2 MG/DL (ref 1.6–2.6)
MCH RBC QN AUTO: 30.1 PG (ref 26.6–33)
MCHC RBC AUTO-ENTMCNC: 32.9 G/DL (ref 31.5–35.7)
MCV RBC AUTO: 91.2 FL (ref 79–97)
MONOCYTES # BLD: 0.44 10*3/MM3 (ref 0.1–0.9)
NEUTROPHILS # BLD AUTO: 2.79 10*3/MM3 (ref 1.7–7)
NEUTROPHILS NFR BLD MANUAL: 55.1 % (ref 42.7–76)
NEUTS BAND NFR BLD MANUAL: 3.1 % (ref 0–5)
NITRITE UR QL STRIP: NEGATIVE
PH UR STRIP.AUTO: 6 [PH] (ref 5–8)
PLAT MORPH BLD: NORMAL
PLATELET # BLD AUTO: 239 10*3/MM3 (ref 140–450)
PMV BLD AUTO: 10 FL (ref 6–12)
POIKILOCYTOSIS BLD QL SMEAR: ABNORMAL
POTASSIUM SERPL-SCNC: 4 MMOL/L (ref 3.5–5.2)
PROT SERPL-MCNC: 7.3 G/DL (ref 6–8.5)
PROT UR QL STRIP: NEGATIVE
RBC # BLD AUTO: 3.76 10*6/MM3 (ref 3.77–5.28)
SODIUM SERPL-SCNC: 136 MMOL/L (ref 136–145)
SP GR UR STRIP: 1.01 (ref 1–1.03)
T3FREE SERPL-MCNC: 2.03 PG/ML (ref 2–4.4)
T4 FREE SERPL-MCNC: 0.84 NG/DL (ref 0.92–1.68)
TSH SERPL DL<=0.05 MIU/L-ACNC: 10.18 UIU/ML (ref 0.27–4.2)
UROBILINOGEN UR QL STRIP: NORMAL
VARIANT LYMPHS NFR BLD MANUAL: 32.7 % (ref 19.6–45.3)
WBC MORPH BLD: NORMAL
WBC NRBC COR # BLD AUTO: 4.8 10*3/MM3 (ref 3.4–10.8)

## 2025-07-08 PROCEDURE — 80053 COMPREHEN METABOLIC PANEL: CPT | Performed by: NURSE PRACTITIONER

## 2025-07-08 PROCEDURE — 70450 CT HEAD/BRAIN W/O DYE: CPT

## 2025-07-08 PROCEDURE — 84443 ASSAY THYROID STIM HORMONE: CPT | Performed by: NURSE PRACTITIONER

## 2025-07-08 PROCEDURE — 82948 REAGENT STRIP/BLOOD GLUCOSE: CPT

## 2025-07-08 PROCEDURE — 85007 BL SMEAR W/DIFF WBC COUNT: CPT | Performed by: NURSE PRACTITIONER

## 2025-07-08 PROCEDURE — 85025 COMPLETE CBC W/AUTO DIFF WBC: CPT | Performed by: NURSE PRACTITIONER

## 2025-07-08 PROCEDURE — 99284 EMERGENCY DEPT VISIT MOD MDM: CPT

## 2025-07-08 PROCEDURE — 84439 ASSAY OF FREE THYROXINE: CPT | Performed by: NURSE PRACTITIONER

## 2025-07-08 PROCEDURE — 84481 FREE ASSAY (FT-3): CPT | Performed by: NURSE PRACTITIONER

## 2025-07-08 PROCEDURE — 80178 ASSAY OF LITHIUM: CPT | Performed by: NURSE PRACTITIONER

## 2025-07-08 PROCEDURE — 81003 URINALYSIS AUTO W/O SCOPE: CPT | Performed by: NURSE PRACTITIONER

## 2025-07-08 PROCEDURE — 81025 URINE PREGNANCY TEST: CPT | Performed by: NURSE PRACTITIONER

## 2025-07-08 PROCEDURE — 83735 ASSAY OF MAGNESIUM: CPT | Performed by: NURSE PRACTITIONER

## 2025-07-08 RX ORDER — SODIUM CHLORIDE 0.9 % (FLUSH) 0.9 %
10 SYRINGE (ML) INJECTION AS NEEDED
Status: DISCONTINUED | OUTPATIENT
Start: 2025-07-08 | End: 2025-07-08 | Stop reason: HOSPADM

## 2025-07-08 NOTE — ED PROVIDER NOTES
Subjective   History of Present Illness  Patient is a 40-year-old female who presents to the ER with multiple complaints.  She states for the past few weeks she has had increased anxiety, intermittent diarrhea, nausea, frequent urination, and blurry vision.  She is on lithium and thyroid medication.  She states that she spoke with another nurse who felt she needed to have labs and her levels checked.  Patient also admits that she has history of alcoholism and had been sober for 6 years.  She states that she drank alcohol again several months ago, states that she has been sober for the past few months however was also concerned this may have some relation.  Patient denies any drug use.  Due to symptoms described came to the ER for evaluation and treatment.  Past medical history significant for bipolar, fibromyalgia, migraines, personality disorder, PE, alcoholism        Review of Systems   Constitutional: Negative.    HENT: Negative.     Eyes:  Positive for visual disturbance.   Respiratory: Negative.     Cardiovascular: Negative.    Gastrointestinal:  Positive for diarrhea and nausea. Negative for abdominal pain and vomiting.   Endocrine: Negative.    Genitourinary:  Positive for frequency.   Musculoskeletal: Negative.    Skin: Negative.    Allergic/Immunologic: Negative.    Neurological: Negative.    Hematological: Negative.    Psychiatric/Behavioral: Negative.     All other systems reviewed and are negative.      Past Medical History:   Diagnosis Date    Bipolar 1 disorder     Fibromyalgia     Migraine     Personality disorder     Pulmonary embolism        Allergies   Allergen Reactions    Calamine Phenolated     Amitriptyline Irritability and Other (See Comments)     Emotionally unstable    Lamotrigine Anxiety and Irritability    Nicotine Rash     Nicotine patches causes reddness       Past Surgical History:   Procedure Laterality Date    CHOLECYSTECTOMY      COLONOSCOPY      ENDOSCOPY         Family History    Problem Relation Age of Onset    Cancer Mother        Social History     Socioeconomic History    Marital status:    Tobacco Use    Smoking status: Every Day     Current packs/day: 1.00     Types: Cigarettes    Smokeless tobacco: Never   Vaping Use    Vaping status: Every Day    Substances: Nicotine    Passive vaping exposure: Yes   Substance and Sexual Activity    Alcohol use: Not Currently    Drug use: No    Sexual activity: Yes     Partners: Male           Objective   Physical Exam  Vitals and nursing note reviewed.   Constitutional:       Appearance: Normal appearance. She is well-developed.   HENT:      Head: Normocephalic and atraumatic.      Right Ear: External ear normal.      Left Ear: External ear normal.      Nose: Nose normal.      Mouth/Throat:      Pharynx: Oropharynx is clear.   Eyes:      Extraocular Movements: Extraocular movements intact.      Conjunctiva/sclera: Conjunctivae normal.   Cardiovascular:      Rate and Rhythm: Normal rate and regular rhythm.      Heart sounds: Normal heart sounds.   Pulmonary:      Effort: Pulmonary effort is normal.      Breath sounds: Normal breath sounds.   Abdominal:      General: Bowel sounds are normal.      Palpations: Abdomen is soft.   Musculoskeletal:         General: Normal range of motion.      Cervical back: Normal range of motion and neck supple.   Skin:     General: Skin is warm and dry.   Neurological:      Mental Status: She is alert and oriented to person, place, and time.   Psychiatric:         Mood and Affect: Mood normal.         Behavior: Behavior normal.         Thought Content: Thought content normal.         Judgment: Judgment normal.         Procedures           ED Course                                                       Medical Decision Making  Patient is a 40-year-old female who presents to the ER with multiple complaints.  She states for the past few weeks she has had increased anxiety, intermittent diarrhea, nausea, frequent  urination, and blurry vision.  She is on lithium and thyroid medication.  She states that she spoke with another nurse who felt she needed to have labs and her levels checked.  Patient also admits that she has history of alcoholism and had been sober for 6 years.  She states that she drank alcohol again several months ago, states that she has been sober for the past few months however was also concerned this may have some relation.  Patient denies any drug use.  Due to symptoms described came to the ER for evaluation and treatment.  Past medical history significant for bipolar, fibromyalgia, migraines, personality disorder, PE, alcoholism    Differential diagnosis includes but not limited to hyperglycemia, electrolyte abnormality, abnormal lithium level, UTI, and other etiologies    Problems Addressed:  Abnormal blood level of lithium: complicated acute illness or injury  Blurred vision: complicated acute illness or injury  Elevated TSH: complicated acute illness or injury    Amount and/or Complexity of Data Reviewed  Labs: ordered.  Radiology: ordered.    Risk  Prescription drug management.      Labs Reviewed   COMPREHENSIVE METABOLIC PANEL - Abnormal; Notable for the following components:       Result Value    CO2 21.0 (*)     All other components within normal limits    Narrative:     GFR Categories in Chronic Kidney Disease (CKD)              GFR Category          GFR (mL/min/1.73)    Interpretation  G1                    90 or greater        Normal or high (1)  G2                    60-89                Mild decrease (1)  G3a                   45-59                Mild to moderate decrease  G3b                   30-44                Moderate to severe decrease  G4                    15-29                Severe decrease  G5                    14 or less           Kidney failure    (1)In the absence of evidence of kidney disease, neither GFR category G1 or G2 fulfill the criteria for CKD.    eGFR calculation 2021  CKD-EPI creatinine equation, which does not include race as a factor   TSH - Abnormal; Notable for the following components:    TSH 10.180 (*)     All other components within normal limits   T4, FREE - Abnormal; Notable for the following components:    Free T4 0.84 (*)     All other components within normal limits   LITHIUM LEVEL - Abnormal; Notable for the following components:    Lithium 0.5 (*)     All other components within normal limits   CBC WITH AUTO DIFFERENTIAL - Abnormal; Notable for the following components:    RBC 3.76 (*)     Hemoglobin 11.3 (*)     All other components within normal limits   MANUAL DIFFERENTIAL - Abnormal; Notable for the following components:    Eosinophil % 0.0 (*)     All other components within normal limits   URINALYSIS W/ MICROSCOPIC IF INDICATED (NO CULTURE) - Normal    Narrative:     Urine microscopic not indicated.   MAGNESIUM - Normal   POCT PEFORM URINE PREGNANCY - Normal   POCT GLUCOSE FINGERSTICK - Normal   T3, FREE   POCT GLUCOSE FINGERSTICK   CBC AND DIFFERENTIAL    Narrative:     The following orders were created for panel order CBC & Differential.  Procedure                               Abnormality         Status                     ---------                               -----------         ------                     CBC Auto Differential[[id number]]        Abnormal            Final result                 Please view results for these tests on the individual orders.      CT Head Without Contrast   Final Result       1. No acute intracranial findings.        This report was signed and finalized on 7/8/2025 6:41 PM by Alban Soto.          Patient's CT scan was negative for acute findings.  Basic labs were unremarkable.  She has chronic anemia and this appears to be at her baseline.  Mildly low hemoglobin noted.  She had abnormal TSH which is described above and lithium level was just slightly low.  She will need follow-up with PCP for reevaluation.    Discharge  instructions to patient:  Your TSH was elevated today at 10.180 (normal ranges 0.270-4.200)  Free T4 was low at 0.84 (normal range 0.92 - 1.68)    Lithium level was slightly low at 0.5 (normal range 0.6-1.2)    Follow-up with your PCP tomorrow regarding your symptoms and your levels and possible further evaluation of your various symptoms described today  Final diagnoses:   Blurred vision   Elevated TSH   Abnormal blood level of lithium       ED Disposition  ED Disposition       ED Disposition   Discharge    Condition   Good    Comment   --               No follow-up provider specified.       Medication List      No changes were made to your prescriptions during this visit.            Veronique Greene, APRN  07/08/25 [id number]

## 2025-07-09 NOTE — DISCHARGE INSTRUCTIONS
Your TSH was elevated today at 10.180 (normal ranges 0.270-4.200)  Free T4 was low at 0.84 (normal range 0.92 - 1.68)    Lithium level was slightly low at 0.5 (normal range 0.6-1.2)    Follow-up with your PCP tomorrow regarding your symptoms and your levels and possible further evaluation of your various symptoms described today